# Patient Record
Sex: FEMALE | Race: BLACK OR AFRICAN AMERICAN | NOT HISPANIC OR LATINO | Employment: UNEMPLOYED | URBAN - METROPOLITAN AREA
[De-identification: names, ages, dates, MRNs, and addresses within clinical notes are randomized per-mention and may not be internally consistent; named-entity substitution may affect disease eponyms.]

---

## 2022-10-18 PROBLEM — F32.9 MAJOR DEPRESSIVE DISORDER, SINGLE EPISODE, UNSPECIFIED: Status: ACTIVE | Noted: 2019-01-24

## 2022-10-19 ENCOUNTER — OFFICE VISIT (OUTPATIENT)
Dept: INTERNAL MEDICINE | Facility: CLINIC | Age: 22
End: 2022-10-19
Payer: COMMERCIAL

## 2022-10-19 VITALS
OXYGEN SATURATION: 98 % | WEIGHT: 99.6 LBS | SYSTOLIC BLOOD PRESSURE: 110 MMHG | DIASTOLIC BLOOD PRESSURE: 75 MMHG | TEMPERATURE: 97.7 F | HEART RATE: 82 BPM

## 2022-10-19 DIAGNOSIS — N92.0 MENORRHAGIA WITH REGULAR CYCLE: ICD-10-CM

## 2022-10-19 DIAGNOSIS — Z91.010 ALLERGY TO PEANUTS: Primary | ICD-10-CM

## 2022-10-19 DIAGNOSIS — J31.0 CHRONIC RHINITIS: ICD-10-CM

## 2022-10-19 LAB
BASOPHILS # BLD AUTO: 0.1 10E3/UL (ref 0–0.2)
BASOPHILS NFR BLD AUTO: 2 %
EOSINOPHIL # BLD AUTO: 0.5 10E3/UL (ref 0–0.7)
EOSINOPHIL NFR BLD AUTO: 13 %
ERYTHROCYTE [DISTWIDTH] IN BLOOD BY AUTOMATED COUNT: 15.3 % (ref 10–15)
FERRITIN SERPL-MCNC: 9 NG/ML (ref 12–150)
HCT VFR BLD AUTO: 37.6 % (ref 35–47)
HGB BLD-MCNC: 11.8 G/DL (ref 11.7–15.7)
IRON SATN MFR SERPL: 18 % (ref 15–46)
IRON SERPL-MCNC: 57 UG/DL (ref 35–180)
LYMPHOCYTES # BLD AUTO: 1.7 10E3/UL (ref 0.8–5.3)
LYMPHOCYTES NFR BLD AUTO: 43 %
MCH RBC QN AUTO: 28 PG (ref 26.5–33)
MCHC RBC AUTO-ENTMCNC: 31.4 G/DL (ref 31.5–36.5)
MCV RBC AUTO: 89 FL (ref 78–100)
MONOCYTES # BLD AUTO: 0.6 10E3/UL (ref 0–1.3)
MONOCYTES NFR BLD AUTO: 14 %
NEUTROPHILS # BLD AUTO: 1.1 10E3/UL (ref 1.6–8.3)
NEUTROPHILS NFR BLD AUTO: 28 %
PLATELET # BLD AUTO: 342 10E3/UL (ref 150–450)
RBC # BLD AUTO: 4.21 10E6/UL (ref 3.8–5.2)
TIBC SERPL-MCNC: 312 UG/DL (ref 240–430)
WBC # BLD AUTO: 4 10E3/UL (ref 4–11)

## 2022-10-19 PROCEDURE — 82728 ASSAY OF FERRITIN: CPT | Performed by: INTERNAL MEDICINE

## 2022-10-19 PROCEDURE — 85025 COMPLETE CBC W/AUTO DIFF WBC: CPT | Performed by: INTERNAL MEDICINE

## 2022-10-19 PROCEDURE — 99204 OFFICE O/P NEW MOD 45 MIN: CPT | Performed by: INTERNAL MEDICINE

## 2022-10-19 PROCEDURE — 83540 ASSAY OF IRON: CPT | Performed by: INTERNAL MEDICINE

## 2022-10-19 PROCEDURE — 83550 IRON BINDING TEST: CPT | Performed by: INTERNAL MEDICINE

## 2022-10-19 PROCEDURE — 36415 COLL VENOUS BLD VENIPUNCTURE: CPT | Performed by: INTERNAL MEDICINE

## 2022-10-19 RX ORDER — EPINEPHRINE 0.3 MG/.3ML
0.3 INJECTION SUBCUTANEOUS PRN
Qty: 2 EACH | Refills: 3 | Status: SHIPPED | OUTPATIENT
Start: 2022-10-19 | End: 2024-08-23

## 2022-10-19 RX ORDER — EPINEPHRINE 0.3 MG/.3ML
0.3 INJECTION SUBCUTANEOUS PRN
COMMUNITY
End: 2022-10-19

## 2022-10-19 RX ORDER — LORATADINE 10 MG/1
10 TABLET ORAL DAILY
COMMUNITY
End: 2022-10-19

## 2022-10-19 RX ORDER — LORATADINE 10 MG/1
10 TABLET ORAL DAILY
Qty: 90 TABLET | Refills: 4 | Status: SHIPPED | OUTPATIENT
Start: 2022-10-19 | End: 2023-08-03

## 2022-10-19 RX ORDER — FLUTICASONE PROPIONATE 50 MCG
1 SPRAY, SUSPENSION (ML) NASAL DAILY
Qty: 18.2 ML | Refills: 3 | Status: SHIPPED | OUTPATIENT
Start: 2022-10-19 | End: 2023-08-03

## 2022-10-19 RX ORDER — FLUTICASONE PROPIONATE 50 MCG
1 SPRAY, SUSPENSION (ML) NASAL DAILY
COMMUNITY
End: 2022-10-19

## 2022-10-19 NOTE — LETTER
[facility]  [address] [address]  Phone: [phone]   10/20/2022      Greglillian Nunes  [address] [facility] [address]        Dear MsGaby Jack DAVILA Des:    I am writing to inform you of the results of the laboratory tests you had done recently. Your blood counts are normal and you are not anemic. However, your iron is mildly low. An iron supplement is recommended in your care. There are lots of over-the-counter iron supplements. The most commonly used preparations are called Ferrous Gluconate and Ferrous Sulfate. Some important things to remember when it comes to iron supplementation:    -Iron is best absorbed when taken on an empty stomach, with water or fruit juice (adults: full glass or 8 ounces; children:   glass or 4 ounces), about 1 hour before or 2 hours after meals. However, to lessen the possibility of stomach upset, iron may be taken with food or immediately after meals. If you take antacids (ex: omeprazole, TUMS, ranitidine), your iron tablets should be taken two hours before or four hours after the antacids.    -Constipation and diarrhea are very common side effects. If constipation becomes a problem, I recommend starting a laxative such as MiraLax or sennosides. Please let me know if these side effects are so severe that you stop taking the iron supplement.    -Iron tablets may cause other drugs you are taking to not work as well. Some of these include tetracycline, penicillin, and ciprofloxacin and drugs used for hypothyroidism, Parkinson disease, and seizures.    -Black stools are normal when taking iron tablets. Tell me right away though if the stools are tarry-looking as well as black, if they have red streaks, or if you develop ramps, sharp pains, or soreness in the stomach.    -Taking a Vitamin C supplement at the same time as your iron supplement will help more iron get absorbed.    -Recently published studies found the  paradoxical result that taking your iron supplement every OTHER day (so Monday, then Wednesday, then Friday, etc) causes more iron absorption then taking the iron supplement every day. This every other day regimen may also cut down on side effects of the iron supplement. However, some patients struggle to remember taking a pill every other day and prefer to still take the iron daily. That's okay! Either regimen will work.    If you have any further questions or problems, please contact our nurse line at [phone]. An even easier way to get ahold of our team is through [employer], which you can sign up for at [email or web address]. [employer] is not only a great way to communicate with us, but also allows you to see your full results.        Sincerely,        Layton Cohen MD, MPH  Department of Internal Medicine  [facility]

## 2022-10-19 NOTE — PATIENT INSTRUCTIONS
- Our team will contact you via [employer] (if you sign up for it), telephone call (if results are urgent), or otherwise via letter in the mail with the results of today's lab tests once I have a chance to review them

## 2022-10-19 NOTE — PROGRESS NOTES
Assessment & Plan   Allergy to peanuts  Refilled chronic script per request.  - EPINEPHrine (ANY BX GENERIC EQUIV) 0.3 MG/0.3ML injection 2-pack; Inject 0.3 mLs (0.3 mg) into the muscle as needed for anaphylaxis May repeat one time in 5-15 minutes if response to initial dose is inadequate.    Chronic rhinitis  Finds benefit from these meds. Refilled.  - fluticasone (FLONASE) 50 MCG/ACT nasal spray; Spray 1 spray into both nostrils daily  - loratadine (CLARITIN) 10 MG tablet; Take 1 tablet (10 mg) by mouth daily    Menorrhagia with regular cycle  I'm unclear what the condition her sister may have been talking about was, but asked her to talk with her family and send me a [employer] message when she has more info on it to see if she ought to be screened/tested for the condition. Will screen for JOSY as below.  - CBC with Platelets & Differential; Future  - Ferritin; Future  - Iron & Iron Binding Capacity; Future    Return in about 6 months (around 4/19/2023) for Physical Exam.    Layton Cohen MD  [facility]    Rodney Santiago is a 22 year old who presents for med check. This is the first time I have met Jack. Reports her allergies have been bad since she ran out of loratadine and Flonase. She'd like to be screened for iron deficiency and anemia. She said her sister told her to get tested for something that's like anemia but isn't anemia. She thinks it starts with a F. She's not sure what it's called and is unable to ask her sister during the visit today. She endorses heavy periods.    Review of Systems   Constitutional, HEENT, gyn systems are negative, except as otherwise noted.      Objective    /75   Pulse 82   Temp 97.7  F (36.5  C) (Temporal)   Wt 45.2 kg (99 lb 9.6 oz)   LMP  (LMP Unknown)   SpO2 98%   There is no height or weight on file to calculate BMI.     Physical Exam   GENERAL: alert and in no distress.  EYES: conjunctivae/corneas clear. EOMs grossly  intact  HENT: Facies symmetric.  RESP: No iWOB.  MSK: Moves all four extremities freely  SKIN: No significant ulcers, lesions, or rashes on the visualized portions of the skin  NEURO: CN II-XII grossly intact.

## 2022-11-18 ENCOUNTER — OFFICE VISIT (OUTPATIENT)
Dept: FAMILY MEDICINE | Facility: CLINIC | Age: 22
End: 2022-11-18
Payer: COMMERCIAL

## 2022-11-18 VITALS
DIASTOLIC BLOOD PRESSURE: 52 MMHG | HEART RATE: 82 BPM | SYSTOLIC BLOOD PRESSURE: 94 MMHG | OXYGEN SATURATION: 100 % | TEMPERATURE: 97 F

## 2022-11-18 DIAGNOSIS — L30.9 ECZEMA, UNSPECIFIED TYPE: Primary | ICD-10-CM

## 2022-11-18 PROCEDURE — 99213 OFFICE O/P EST LOW 20 MIN: CPT

## 2022-11-18 RX ORDER — TRIAMCINOLONE ACETONIDE 1 MG/G
CREAM TOPICAL 2 TIMES DAILY
Qty: 80 G | Refills: 0 | Status: SHIPPED | OUTPATIENT
Start: 2022-11-18 | End: 2022-11-25

## 2022-11-18 NOTE — PROGRESS NOTES
Assessment & Plan     Eczema, unspecified type    - triamcinolone (KENALOG) 0.1 % external cream; Apply topically 2 times daily for 7 days      12 minutes spent on the date of the encounter doing chart review, patient visit and documentation        See Patient Instructions    Return in about 2 weeks (around 12/2/2022), or if symptoms worsen or fail to improve.    [facility]    Rodney Santiago is a 22 year old presenting for the following health issues:  Urgent Care and Derm Problem (Hx of eczema and has used otc cream. NO improvement. Really itchy and open)      HPI     Presents with possible eczema to the tops of her hands.  States she gets this seasonally and has tried many over-the-counter products which do not seem to be helping much.  We will get patches of erythema and flakiness to the tops of her hands but not the palms of her hands.  No scales    Review of Systems   Constitutional, HEENT, cardiovascular, pulmonary, gi and gu systems are negative, except as otherwise noted.      Objective    BP 94/52   Pulse 82   Temp 97  F (36.1  C) (Tympanic)   SpO2 100%   There is no height or weight on file to calculate BMI.  Physical Exam   GENERAL: healthy, alert and no distress  SKIN: Patches of erythema to the dorsums of both hands.

## 2023-02-12 ENCOUNTER — HEALTH MAINTENANCE LETTER (OUTPATIENT)
Age: 23
End: 2023-02-12

## 2023-07-17 ENCOUNTER — OFFICE VISIT (OUTPATIENT)
Dept: FAMILY MEDICINE | Facility: CLINIC | Age: 23
End: 2023-07-17
Payer: COMMERCIAL

## 2023-07-17 VITALS
HEART RATE: 100 BPM | DIASTOLIC BLOOD PRESSURE: 70 MMHG | SYSTOLIC BLOOD PRESSURE: 110 MMHG | TEMPERATURE: 98.9 F | OXYGEN SATURATION: 98 %

## 2023-07-17 DIAGNOSIS — R10.9 ABDOMINAL DISCOMFORT: ICD-10-CM

## 2023-07-17 DIAGNOSIS — R30.0 DYSURIA: Primary | ICD-10-CM

## 2023-07-17 LAB
ALBUMIN UR-MCNC: NEGATIVE MG/DL
APPEARANCE UR: CLEAR
BILIRUB UR QL STRIP: NEGATIVE
COLOR UR AUTO: YELLOW
GLUCOSE UR STRIP-MCNC: NEGATIVE MG/DL
HGB UR QL STRIP: NEGATIVE
KETONES UR STRIP-MCNC: NEGATIVE MG/DL
LEUKOCYTE ESTERASE UR QL STRIP: NEGATIVE
NITRATE UR QL: NEGATIVE
PH UR STRIP: 6.5 [PH] (ref 5–7)
SP GR UR STRIP: 1.01 (ref 1–1.03)
UROBILINOGEN UR STRIP-ACNC: 0.2 E.U./DL

## 2023-07-17 PROCEDURE — 99203 OFFICE O/P NEW LOW 30 MIN: CPT

## 2023-07-17 PROCEDURE — 81003 URINALYSIS AUTO W/O SCOPE: CPT

## 2023-07-17 ASSESSMENT — ENCOUNTER SYMPTOMS
HEMATURIA: 0
FEVER: 0
ABDOMINAL PAIN: 1
DIFFICULTY URINATING: 1
FLANK PAIN: 0

## 2023-07-17 NOTE — PATIENT INSTRUCTIONS
[facility]  Located in: [facility]  Address: [address] , [place] [address]  Open ? Closes 8?PM  Phone: [phone]    Please go to the above clinic, check in at the Urgent Care desk for a LAB ONLY visit.

## 2023-07-17 NOTE — PROGRESS NOTES
Assessment & Plan       ICD-10-CM    1. Dysuria  R30.0 UA Macroscopic with reflex to Microscopic and Culture      2. Abdominal discomfort  R10.9 Wet preparation - Clinic Collect           Patient instructions:  To get lab, at this time increase fluids, monitor for new or worsening symptoms and schedule follow up    Medical decision making:  UA negative, Wet prep pending. Discussed scheduling follow up with PCP for well woman and review of symptoms if everything negative, this could be underlying endometriosis, BV, yeast infection, cysts.     Return if symptoms worsen or fail to improve, for Follow up.    At the end of the encounter, I discussed results, diagnosis, medications. Discussed red flags for immediate return to clinic/ER, as well as indications for follow up if no improvement. Patient understood and agreed to plan. Patient was stable for discharge.    Rodney Santiago is a 23 year old female who presents to clinic today for the following health issues:  Chief Complaint   Patient presents with     Urgent Care     UTI     Sx started last night with pain when urinating, bloating sensation and cloudy urine     LMP: beginning of this month, not sexually active    UTI  This is a new problem. The current episode started yesterday. The problem has been unchanged. Associated symptoms include abdominal pain. Pertinent negatives include no fever.           Review of Systems   Constitutional: Negative for fever.   Gastrointestinal: Positive for abdominal pain.   Genitourinary: Positive for difficulty urinating and urgency. Negative for flank pain, hematuria and vaginal discharge.       Problem List:  There are no relevant problems documented for this patient.      History reviewed. No pertinent past medical history.    Social History     Tobacco Use     Smoking status: Never     Passive exposure: Never     Smokeless tobacco: Never   Substance Use Topics     Alcohol use: Not on file           Objective    /70    Pulse 100   Temp 98.9  F (37.2  C) (Tympanic)   LMP 07/03/2023   SpO2 98%   Physical Exam  Constitutional:       Appearance: Normal appearance.   HENT:      Head: Normocephalic and atraumatic.   Abdominal:      General: Abdomen is flat. Bowel sounds are normal.      Tenderness: There is no abdominal tenderness. Negative signs include Griffin's sign and McBurney's sign.   Musculoskeletal:      Cervical back: Normal range of motion and neck supple.   Skin:     General: Skin is warm and dry.   Neurological:      General: No focal deficit present.      Mental Status: She is alert and oriented to person, place, and time.   Psychiatric:         Mood and Affect: Mood normal.         Behavior: Behavior normal.         Thought Content: Thought content normal.         Judgment: Judgment normal.              Oj Gonzalez PA-C

## 2023-07-18 ENCOUNTER — LAB (OUTPATIENT)
Dept: LAB | Facility: CLINIC | Age: 23
End: 2023-07-18
Payer: COMMERCIAL

## 2023-07-18 DIAGNOSIS — R10.9 ABDOMINAL DISCOMFORT: ICD-10-CM

## 2023-07-18 LAB
CLUE CELLS: ABNORMAL
TRICHOMONAS, WET PREP: ABNORMAL
WBC'S/HIGH POWER FIELD, WET PREP: ABNORMAL
YEAST, WET PREP: ABNORMAL

## 2023-07-18 PROCEDURE — 87210 SMEAR WET MOUNT SALINE/INK: CPT

## 2023-08-03 ENCOUNTER — OFFICE VISIT (OUTPATIENT)
Dept: INTERNAL MEDICINE | Facility: CLINIC | Age: 23
End: 2023-08-03
Payer: COMMERCIAL

## 2023-08-03 VITALS
TEMPERATURE: 98.1 F | WEIGHT: 102.6 LBS | HEIGHT: 66 IN | HEART RATE: 57 BPM | BODY MASS INDEX: 16.49 KG/M2 | OXYGEN SATURATION: 97 % | SYSTOLIC BLOOD PRESSURE: 98 MMHG | DIASTOLIC BLOOD PRESSURE: 64 MMHG

## 2023-08-03 DIAGNOSIS — E53.8 VITAMIN B12 DEFICIENCY (NON ANEMIC): ICD-10-CM

## 2023-08-03 DIAGNOSIS — R30.0 DYSURIA: Primary | ICD-10-CM

## 2023-08-03 DIAGNOSIS — Z86.2 HISTORY OF IRON DEFICIENCY ANEMIA: ICD-10-CM

## 2023-08-03 DIAGNOSIS — R53.83 OTHER FATIGUE: ICD-10-CM

## 2023-08-03 DIAGNOSIS — E55.9 VITAMIN D DEFICIENCY: ICD-10-CM

## 2023-08-03 DIAGNOSIS — R39.15 URINARY URGENCY: ICD-10-CM

## 2023-08-03 DIAGNOSIS — J31.0 CHRONIC RHINITIS: ICD-10-CM

## 2023-08-03 LAB
ALBUMIN UR-MCNC: NEGATIVE MG/DL
ANION GAP SERPL CALCULATED.3IONS-SCNC: 10 MMOL/L (ref 7–15)
APPEARANCE UR: CLEAR
BACTERIA #/AREA URNS HPF: ABNORMAL /HPF
BILIRUB UR QL STRIP: NEGATIVE
BUN SERPL-MCNC: 4.7 MG/DL (ref 6–20)
CALCIUM SERPL-MCNC: 9.5 MG/DL (ref 8.6–10)
CHLORIDE SERPL-SCNC: 103 MMOL/L (ref 98–107)
COLOR UR AUTO: YELLOW
CREAT SERPL-MCNC: 0.63 MG/DL (ref 0.51–0.95)
DEPRECATED CALCIDIOL+CALCIFEROL SERPL-MC: 21 UG/L (ref 20–75)
DEPRECATED HCO3 PLAS-SCNC: 25 MMOL/L (ref 22–29)
ERYTHROCYTE [DISTWIDTH] IN BLOOD BY AUTOMATED COUNT: 13 % (ref 10–15)
FERRITIN SERPL-MCNC: 20 NG/ML (ref 6–175)
GFR SERPL CREATININE-BSD FRML MDRD: >90 ML/MIN/1.73M2
GLUCOSE SERPL-MCNC: 89 MG/DL (ref 70–99)
GLUCOSE UR STRIP-MCNC: NEGATIVE MG/DL
HCT VFR BLD AUTO: 38.4 % (ref 35–47)
HGB BLD-MCNC: 12.6 G/DL (ref 11.7–15.7)
HGB UR QL STRIP: ABNORMAL
IRON BINDING CAPACITY (ROCHE): 308 UG/DL (ref 240–430)
IRON SATN MFR SERPL: 25 % (ref 15–46)
IRON SERPL-MCNC: 76 UG/DL (ref 37–145)
KETONES UR STRIP-MCNC: NEGATIVE MG/DL
LEUKOCYTE ESTERASE UR QL STRIP: NEGATIVE
MCH RBC QN AUTO: 30.1 PG (ref 26.5–33)
MCHC RBC AUTO-ENTMCNC: 32.8 G/DL (ref 31.5–36.5)
MCV RBC AUTO: 92 FL (ref 78–100)
MUCOUS THREADS #/AREA URNS LPF: PRESENT /LPF
NITRATE UR QL: NEGATIVE
PH UR STRIP: 6 [PH] (ref 5–7)
PLATELET # BLD AUTO: 265 10E3/UL (ref 150–450)
POTASSIUM SERPL-SCNC: 4.6 MMOL/L (ref 3.4–5.3)
RBC # BLD AUTO: 4.18 10E6/UL (ref 3.8–5.2)
RBC #/AREA URNS AUTO: ABNORMAL /HPF
SODIUM SERPL-SCNC: 138 MMOL/L (ref 136–145)
SP GR UR STRIP: 1.02 (ref 1–1.03)
SQUAMOUS #/AREA URNS AUTO: ABNORMAL /LPF
TSH SERPL DL<=0.005 MIU/L-ACNC: 1.07 UIU/ML (ref 0.3–4.2)
UROBILINOGEN UR STRIP-ACNC: 0.2 E.U./DL
VIT B12 SERPL-MCNC: 767 PG/ML (ref 232–1245)
WBC # BLD AUTO: 4.4 10E3/UL (ref 4–11)
WBC #/AREA URNS AUTO: ABNORMAL /HPF

## 2023-08-03 PROCEDURE — 83550 IRON BINDING TEST: CPT | Performed by: INTERNAL MEDICINE

## 2023-08-03 PROCEDURE — 82306 VITAMIN D 25 HYDROXY: CPT | Performed by: INTERNAL MEDICINE

## 2023-08-03 PROCEDURE — 84443 ASSAY THYROID STIM HORMONE: CPT | Performed by: INTERNAL MEDICINE

## 2023-08-03 PROCEDURE — 82607 VITAMIN B-12: CPT | Performed by: INTERNAL MEDICINE

## 2023-08-03 PROCEDURE — 36415 COLL VENOUS BLD VENIPUNCTURE: CPT | Performed by: INTERNAL MEDICINE

## 2023-08-03 PROCEDURE — 80048 BASIC METABOLIC PNL TOTAL CA: CPT | Performed by: INTERNAL MEDICINE

## 2023-08-03 PROCEDURE — 99214 OFFICE O/P EST MOD 30 MIN: CPT | Performed by: INTERNAL MEDICINE

## 2023-08-03 PROCEDURE — 83540 ASSAY OF IRON: CPT | Performed by: INTERNAL MEDICINE

## 2023-08-03 PROCEDURE — 82728 ASSAY OF FERRITIN: CPT | Performed by: INTERNAL MEDICINE

## 2023-08-03 PROCEDURE — 85027 COMPLETE CBC AUTOMATED: CPT | Performed by: INTERNAL MEDICINE

## 2023-08-03 PROCEDURE — 81001 URINALYSIS AUTO W/SCOPE: CPT | Performed by: INTERNAL MEDICINE

## 2023-08-03 RX ORDER — FLUTICASONE PROPIONATE 50 MCG
1 SPRAY, SUSPENSION (ML) NASAL DAILY
Qty: 48 ML | Refills: 3 | Status: SHIPPED | OUTPATIENT
Start: 2023-08-03

## 2023-08-03 RX ORDER — LORATADINE 10 MG/1
10 TABLET ORAL DAILY
Qty: 90 TABLET | Refills: 4 | Status: SHIPPED | OUTPATIENT
Start: 2023-08-03

## 2023-08-03 RX ORDER — SULFAMETHOXAZOLE/TRIMETHOPRIM 800-160 MG
1 TABLET ORAL 2 TIMES DAILY
Qty: 10 TABLET | Refills: 0 | Status: SHIPPED | OUTPATIENT
Start: 2023-08-03 | End: 2023-08-08

## 2023-08-03 ASSESSMENT — PATIENT HEALTH QUESTIONNAIRE - PHQ9
SUM OF ALL RESPONSES TO PHQ QUESTIONS 1-9: 4
SUM OF ALL RESPONSES TO PHQ QUESTIONS 1-9: 4
10. IF YOU CHECKED OFF ANY PROBLEMS, HOW DIFFICULT HAVE THESE PROBLEMS MADE IT FOR YOU TO DO YOUR WORK, TAKE CARE OF THINGS AT HOME, OR GET ALONG WITH OTHER PEOPLE: NOT DIFFICULT AT ALL

## 2023-08-03 ASSESSMENT — ASTHMA QUESTIONNAIRES: ACT_TOTALSCORE: 24

## 2023-08-03 ASSESSMENT — PAIN SCALES - GENERAL: PAINLEVEL: NO PAIN (0)

## 2023-08-03 NOTE — PROGRESS NOTES
Assessment & Plan     (R30.0) Dysuria  (primary encounter diagnosis)  Comment:    Possible urinary tract infection as the cause of the sudden urinary urgency.      Short course of antibiotics.      If the symptoms completely resolve with this treatment, then no further workup required.      If the symptosm continue, then referral to uro Gynecology to investigate  further causes for the mild hematuria and urinary irgency.  (Small amount of blood in the urine)     Rechecking blood tests and I will let you know if anything is abnormal    Plan: UA Macroscopic with reflex to Microscopic and         Culture - Clinic Collect, UA Microscopic with         Reflex to Culture,         sulfamethoxazole-trimethoprim (BACTRIM DS)         800-160 MG tablet            (R39.15) Urinary urgency  Comment:   Plan: Adult Uro/Gyn  Referral,         sulfamethoxazole-trimethoprim (BACTRIM DS)         800-160 MG tablet            (R53.83) Other fatigue  Comment: No obvious medical cause for the fatigue.    Will check for routine medical cause with the labs as ordered, suspect they will return abnormal.  Will follow up any abnormal labs as indicated.  D  Nonspecific fatigue may have other causes including stress and problems at home or work, or may be associated with underlying mood disorders in general.    Other causes may include sleep apnea, chronic fatigue syndrome, post viral syndromes, fibromyalgia, connective tissue disease etc.   Plan: CBC with platelets, Vitamin B12, Iron & Iron         Binding Capacity, Ferritin, TSH with free T4         reflex, Basic metabolic panel, Vitamin D         Deficiency            (E55.9) Vitamin D deficiency  Comment:   Plan: Vitamin D Deficiency            (Z86.2) History of iron deficiency anemia  Comment:   Plan: CBC with platelets, Vitamin B12, Iron & Iron         Binding Capacity, Ferritin            (E53.8) Vitamin B12 deficiency (non anemic)  Comment:   Plan: Vitamin B12         "    (J31.0) Chronic rhinitis  Comment: trial regular steroid nasal spray and daily claritin.   Plan: fluticasone (FLONASE) 50 MCG/ACT nasal spray,         loratadine (CLARITIN) 10 MG tablet                      MED REC REQUIRED  Post Medication Reconciliation Status: discharge medications reconciled, continue medications without change      Irineo Fishman MD  [facility] is a 23 year old, presenting for the following health issues:  RECHECK (Follow up U/C-7/17/23)        8/3/2023     8:19 AM   Additional Questions   Roomed by Opal PEREZ       [facility]     ED/UC Followup:    Facility:  [facility] U/C  Date of visit: 7/17/23  Reason for visit: abdominal discomfort and urinary problem  Current Status: urinary symptoms continuing- abdominal pain resolved    Urinary urgency starting 3 weeks ago  No flank pain  No fevers, no vaginal discharge  Normal menses with \"normal\" flow.  Has prior history of hematuria off and on for last 10+ years in the past, extensive workup shows no causes, it just resolved without treatment or diagnosis.   Some results available in EPIC.       history kvng on def anemia, wants to know her iron levels.   Labs all looked good in Oct 2022 with normal CBC and iron levels, ferritin low.     Reports history of vitamin B12 and D deficiency in the past, wants these checked.       Reports nonspecific fatigue of a generalized nature for the last few months.  Denies intestinal symptoms (i.e. No diarrhea, no constipation, no irregular BMs), Denies chest pain, shortness of breath, or dyspnea on exertion.  Denies fevers, unintended weight loss, unexplained abdominal pain, unexplained joint or muscle pain,  recent travels, or  history of autoimmune disease.   The patient does not feel that they are aware of any specific cause for this fatigue.      **I reviewed the information recorded in the patient's [facility] chart (including but not limited to medical " "history, surgical history, family history, problem list, medication list, and allergy list) and updated the information as indicated based on the patients reported information.        Review of Systems   Constitutional, HEENT, cardiovascular, pulmonary, gi and gu systems are negative, except as otherwise noted.      Objective    BP 98/64   Pulse 57   Temp 98.1  F (36.7  C) (Oral)   Ht 1.676 m (5' 6\")   Wt 46.5 kg (102 lb 9.6 oz)   LMP 07/28/2023   SpO2 97%   BMI 16.56 kg/m    Body mass index is 16.56 kg/m .  Physical Exam   GENERAL alert and no distress  EYES:  Normal sclera,conjunctiva, EOMI  HENT: oral and posterior pharynx without lesions or erythema, facies symmetric  NECK: Neck supple. No LAD, without thyroidmegaly.  RESP: Clear to ausculation bilaterally without wheezes or crackles. Normal BS in all fields.  CV: RRR normal S1S2 without murmurs, rubs or gallops.  LYMPH: no cervical lymph adenopathy appreciated  MS: extremities- no gross deformities of the visible extremities noted,   EXT:  no lower extremity edema  PSYCH: Alert and oriented times 3; speech- coherent  SKIN:  No obvious significant skin lesions on visible portions of face   ABD:  soft, nondistended, no obviosu tenderess, minimal if any discomfort with palaptio of lower abdomen.     Results for orders placed or performed in visit on 08/03/23   UA Macroscopic with reflex to Microscopic and Culture - Clinic Collect     Status: Abnormal    Specimen: Urine, Clean Catch   Result Value Ref Range    Color Urine Yellow Colorless, Straw, Light Yellow, Yellow    Appearance Urine Clear Clear    Glucose Urine Negative Negative mg/dL    Bilirubin Urine Negative Negative    Ketones Urine Negative Negative mg/dL    Specific Gravity Urine 1.025 1.003 - 1.035    Blood Urine Moderate (A) Negative    pH Urine 6.0 5.0 - 7.0    Protein Albumin Urine Negative Negative mg/dL    Urobilinogen Urine 0.2 0.2, 1.0 E.U./dL    Nitrite Urine Negative Negative    " Leukocyte Esterase Urine Negative Negative   CBC with platelets     Status: Normal   Result Value Ref Range    WBC Count 4.4 4.0 - 11.0 10e3/uL    RBC Count 4.18 3.80 - 5.20 10e6/uL    Hemoglobin 12.6 11.7 - 15.7 g/dL    Hematocrit 38.4 35.0 - 47.0 %    MCV 92 78 - 100 fL    MCH 30.1 26.5 - 33.0 pg    MCHC 32.8 31.5 - 36.5 g/dL    RDW 13.0 10.0 - 15.0 %    Platelet Count 265 150 - 450 10e3/uL   Vitamin B12     Status: Normal   Result Value Ref Range    Vitamin B12 767 232 - 1,245 pg/mL   Iron & Iron Binding Capacity     Status: Normal   Result Value Ref Range    Iron 76 37 - 145 ug/dL    Iron Binding Capacity 308 240 - 430 ug/dL    Iron Sat Index 25 15 - 46 %   Ferritin     Status: Normal   Result Value Ref Range    Ferritin 20 6 - 175 ng/mL   TSH with free T4 reflex     Status: Normal   Result Value Ref Range    TSH 1.07 0.30 - 4.20 uIU/mL   Basic metabolic panel     Status: Abnormal   Result Value Ref Range    Sodium 138 136 - 145 mmol/L    Potassium 4.6 3.4 - 5.3 mmol/L    Chloride 103 98 - 107 mmol/L    Carbon Dioxide (CO2) 25 22 - 29 mmol/L    Anion Gap 10 7 - 15 mmol/L    Urea Nitrogen 4.7 (L) 6.0 - 20.0 mg/dL    Creatinine 0.63 0.51 - 0.95 mg/dL    Calcium 9.5 8.6 - 10.0 mg/dL    Glucose 89 70 - 99 mg/dL    GFR Estimate >90 >60 mL/min/1.73m2   Vitamin D Deficiency     Status: Normal   Result Value Ref Range    Vitamin D, Total (25-Hydroxy) 21 20 - 75 ug/L    Narrative    Season, race, dietary intake, and treatment affect the concentration of 25-hydroxy-Vitamin D. Values may decrease during winter months and increase during summer months. Values 20-29 ug/L may indicate Vitamin D insufficiency and values <20 ug/L may indicate Vitamin D deficiency.    Vitamin D determination is routinely performed by an immunoassay specific for 25 hydroxyvitamin D3.  If an individual is on vitamin D2(ergocalciferol) supplementation, please specify 25 OH vitamin D2 and D3 level determination by LCMSMS test VITD23.     UA  Microscopic with Reflex to Culture     Status: Abnormal   Result Value Ref Range    Bacteria Urine Moderate (A) None Seen /HPF    RBC Urine 5-10 (A) 0-2 /HPF /HPF    WBC Urine 0-5 0-5 /HPF /HPF    Squamous Epithelials Urine Few (A) None Seen /LPF    Mucus Urine Present (A) None Seen /LPF    Narrative    Urine Culture not indicated

## 2023-08-03 NOTE — PATIENT INSTRUCTIONS
Possible urinary tract infection as the cause of the sudden urinary urgency.      Short course of antibiotics.      If the symptoms completely resolve with this treatment, then no further workup required.      If the symptosm continue, then referral to uro Gynecology to investigate  further causes for the mild hematuria and urinary irgency.  (Small amount of blood in the urine)     Rechecking blood tests and I will let you know if anything is abnormal

## 2023-08-18 ENCOUNTER — OFFICE VISIT (OUTPATIENT)
Dept: INTERNAL MEDICINE | Facility: CLINIC | Age: 23
End: 2023-08-18
Payer: COMMERCIAL

## 2023-08-18 VITALS
BODY MASS INDEX: 16.55 KG/M2 | OXYGEN SATURATION: 100 % | SYSTOLIC BLOOD PRESSURE: 100 MMHG | HEART RATE: 78 BPM | HEIGHT: 66 IN | DIASTOLIC BLOOD PRESSURE: 60 MMHG | TEMPERATURE: 98 F | WEIGHT: 103 LBS

## 2023-08-18 DIAGNOSIS — R53.83 FATIGUE, UNSPECIFIED TYPE: Primary | ICD-10-CM

## 2023-08-18 DIAGNOSIS — Z23 NEED FOR TDAP VACCINATION: ICD-10-CM

## 2023-08-18 DIAGNOSIS — N31.9 BLADDER DYSFUNCTION: ICD-10-CM

## 2023-08-18 DIAGNOSIS — Z86.2 HISTORY OF IRON DEFICIENCY ANEMIA: ICD-10-CM

## 2023-08-18 LAB — HGB BLD-MCNC: 12.5 G/DL (ref 11.7–15.7)

## 2023-08-18 PROCEDURE — 99213 OFFICE O/P EST LOW 20 MIN: CPT | Mod: 25 | Performed by: INTERNAL MEDICINE

## 2023-08-18 PROCEDURE — 36415 COLL VENOUS BLD VENIPUNCTURE: CPT | Performed by: INTERNAL MEDICINE

## 2023-08-18 PROCEDURE — 90715 TDAP VACCINE 7 YRS/> IM: CPT | Performed by: INTERNAL MEDICINE

## 2023-08-18 PROCEDURE — 90471 IMMUNIZATION ADMIN: CPT | Performed by: INTERNAL MEDICINE

## 2023-08-18 PROCEDURE — 85018 HEMOGLOBIN: CPT | Performed by: INTERNAL MEDICINE

## 2023-08-18 ASSESSMENT — PATIENT HEALTH QUESTIONNAIRE - PHQ9
SUM OF ALL RESPONSES TO PHQ QUESTIONS 1-9: 0
10. IF YOU CHECKED OFF ANY PROBLEMS, HOW DIFFICULT HAVE THESE PROBLEMS MADE IT FOR YOU TO DO YOUR WORK, TAKE CARE OF THINGS AT HOME, OR GET ALONG WITH OTHER PEOPLE: NOT DIFFICULT AT ALL
SUM OF ALL RESPONSES TO PHQ QUESTIONS 1-9: 0

## 2023-08-18 ASSESSMENT — PAIN SCALES - GENERAL: PAINLEVEL: NO PAIN (0)

## 2023-08-18 ASSESSMENT — ASTHMA QUESTIONNAIRES: ACT_TOTALSCORE: 24

## 2023-08-18 NOTE — PROGRESS NOTES
"  Assessment & Plan     (R53.83) Fatigue, unspecified type  (primary encounter diagnosis)  Comment: No obvious cause for the fatigue, labs all normal including hemoglobin and iron.  No obvious medical cause for the fatigue.    Nonspecific fatigue may have other causes including stress and problems at home or work, or may be associated with underlying mood disorders in general.    Other causes may include sleep apnea, chronic fatigue syndrome, post viral syndromes, fibromyalgia, connective tissue disease etc.   Plan: Hemoglobin            (Z86.2) History of iron deficiency anemia  Comment: Labs all normal including hemoglobin and iron and ferritin.  Continue what ever measures she has been taking to increase her iron intake.  Plan:     (N31.9) Bladder dysfunction  Comment: Suspect largely a functional matter with her \"nervous bladder\".  Referral to urogynecology was ordered, she was contacted to make an appointment but has not made the return call yet.  Plan:     (Z23) Need for Tdap vaccination  Comment: Reviewed vaccine recommendations.  Recommended a update of the tetanus shot every 10 years, this was given today.  Strongly recommend she get the annual influenza vaccine every fall, strongly recommend she proceed with the COVID vaccines as well.  Plan: TDAP 10-64Y (ADACEL,BOOSTRIX)                            Irineo Fishman MD  [facility] SILVINO Santiago is a 23 year old, presenting for the following health issues:  Fatigue (Would like iron levels checked)    Here today with her mother to review recent labs and recent symptoms.        8/18/2023    10:53 AM   Additional Questions   Roomed by Opal LEONG CMA     History of Present Illness       Reason for visit:  Follow up    She eats 0-1 servings of fruits and vegetables daily.She consumes 1 sweetened beverage(s) daily.She exercises with enough effort to increase her heart rate 9 or less minutes per day.  She exercises " "with enough effort to increase her heart rate 3 or less days per week.   She is taking medications regularly.       Chief Complaint   Patient presents with    Fatigue     Would like iron levels checked       Seen last week for evaluation of fatigue, worried about anemia.  She had a previous history of anemia with low ferritin due to combination of menstrual blood loss and lack of dietary iron  Since last year she has been taking molasses as a way to improve iron intake.    Denies any obvious blood loss such as bloody nose, excessive menses, surgeries, or excessive bruising.  Does not donate blood.    2.  Reports nonspecific fatigue of a generalized nature for the last few months.  Denies intestinal symptoms (i.e. No diarrhea, no constipation, no irregular BMs), Denies chest pain, shortness of breath, or dyspnea on exertion.  Denies fevers, unintended weight loss, unexplained abdominal pain, unexplained joint or muscle pain,  recent travels, or  history of autoimmune disease.   The patient does not feel that they are aware of any specific cause for this fatigue.      Recent labs in 2 weeks ago showed no obvious reasons for any specific fatigue    3.  Reports a history of a \"nervous bladder\".  She finds her self with urinary urgency.  She does state that she does not like to use pelvic restrooms and therefore will \"hold\" her urine until she can urinate in private.  She had a history of microscopic hematuria over the years has underwent evaluation at other clinics without a specific diagnosis or abnormal finding.  She was seen 2 weeks ago for concerns of a potential bladder infection.  Urinalysis did not show any evidence for infection, a very short course of oral antibiotics made no difference in her symptoms.      Review of Systems   Constitutional, HEENT, cardiovascular, pulmonary, gi and gu systems are negative, except as otherwise noted.      Objective    Pulse 78   Temp 98  F (36.7  C) (Oral)   Ht 1.676 m (5' " "6\")   Wt 46.7 kg (103 lb)   LMP 07/28/2023   SpO2 100%   Breastfeeding No   BMI 16.62 kg/m    Body mass index is 16.62 kg/m .  Physical Exam   GENERAL alert and no distress  EYES:  Normal sclera,conjunctiva, EOMI  HENT: facies symmetric  MS: extremities- no gross deformities of the visible extremities noted,   PSYCH: Alert and oriented times 3; speech- coherent  SKIN:  No obvious significant skin lesions on visible portions of face   NEURO:  Normal facial movements.  Appears to move normally                       "

## 2023-08-18 NOTE — PATIENT INSTRUCTIONS
" Tetanus vaccine given today, should get this every 10 years.     Covid vaccines are now recommended annually.  Get the most updated Covid vaccine when it becomes available, consider getting this at the same time as the annual influenza vaccine.      Referral to Uro-Gynecology clinic to evaluate your \"nervous bladder\"     For optimal bone health in the future:     --Calcium (at least 1200 mg per day)  --Vitamin D3 (at least 2000 units per day) supplements either separately or together in the form of Caltrate (or similar product).   --Regular exercise to keep muscles strong and weight stable  --No smoking         --Good Grains:  Oats, brown rice, Quinoa (these do not raise the blood sugar as much)     --Bad grains:  Anything made from wheat or white rice     (because these raise the blood sugars significantly, and the possible gluten issue from wheat for some people).      --Proteins:  Aim for \"lean proteins\" including chicken, fish, seafood, pork, turkey, and eggs (in moderation); Eat red meat only occasionally    "

## 2024-01-19 ENCOUNTER — OFFICE VISIT (OUTPATIENT)
Dept: FAMILY MEDICINE | Facility: CLINIC | Age: 24
End: 2024-01-19
Payer: COMMERCIAL

## 2024-01-19 VITALS
SYSTOLIC BLOOD PRESSURE: 106 MMHG | DIASTOLIC BLOOD PRESSURE: 73 MMHG | TEMPERATURE: 98 F | HEART RATE: 69 BPM | OXYGEN SATURATION: 100 %

## 2024-01-19 DIAGNOSIS — B07.8 COMMON WART: ICD-10-CM

## 2024-01-19 DIAGNOSIS — M25.441 FINGER JOINT SWELLING, RIGHT: Primary | ICD-10-CM

## 2024-01-19 LAB — WBC # BLD AUTO: 4.5 10E3/UL (ref 4–11)

## 2024-01-19 PROCEDURE — 36415 COLL VENOUS BLD VENIPUNCTURE: CPT

## 2024-01-19 PROCEDURE — 99213 OFFICE O/P EST LOW 20 MIN: CPT | Mod: 25

## 2024-01-19 PROCEDURE — 17111 DESTRUCTION B9 LESIONS 15/>: CPT

## 2024-01-19 PROCEDURE — 85048 AUTOMATED LEUKOCYTE COUNT: CPT

## 2024-01-19 NOTE — PROGRESS NOTES
ICD-10-CM    1. Finger joint swelling, right  M25.441 WBC count     WBC count      2. Common wart  B07.8         R second finger DIP and 4th finger DIP.  Minimal swelling but some tenderness along the joint line and more significant redness without any fluctuance.  Low suspicion for joint space infection or cellulitis, but checking WBC and will follow up with patient if that suggests a need for antibiotics.  Suspect that redness could be related to new onset warts and the body's inflammatory response to these.    No systemic symptoms or any other joint pain/swelling/redness, so lower suspicion for RA or other rheum issues at this time.  However, if symptoms persist, additional testing would be indicated.    PLAN:  Patient Instructions   Ibuprofen 600 mg three times daily or naproxen 2 tablets twice daily.  Choose one of these options and do this for 3 days.    Today we froze 4 warts on your right hand.  Return in 3 weeks if another freezing treatment is needed.  Can also use Compound W over the counter.    LN2 x 3 freeze-thaw cycles to each of four common warts on the R hand.  Pt tolerated procedure well.  Reviewed typical progression following wart freezing.    SUBJECTIVE:  Jack Nunes is a 23 year old female who presents to  today with some swelling and redness around the 2nd and 4th DIP joints on her right hand.  No known injury.  Does have some warts in this area on the 2nd finger but not on the 4th.  No fevers or chills.  Feeling generally well overall.    OBJECTIVE:  /73   Pulse 69   Temp 98  F (36.7  C) (Tympanic)   SpO2 100%   GEN: well-appearing, in NAD  EXT:  relatively flat common warts on R 2nd finger x 3, on dorsal PIP joint and two around the DIP joint dorsum/ulnar side.  R 3rd finger PIP joint dorsal aspect.  Mild tenderness at 2nd and 4th DIP joints and mild erythema.  No open sores noted.  No fluctuance.  Full AROM.

## 2024-01-19 NOTE — PATIENT INSTRUCTIONS
Ibuprofen 600 mg three times daily or naproxen 2 tablets twice daily.  Choose one of these options and do this for 3 days.    Today we froze 4 warts on your right hand.  Return in 3 weeks if another freezing treatment is needed.  Can also use Compound W over the counter.

## 2024-03-10 ENCOUNTER — HEALTH MAINTENANCE LETTER (OUTPATIENT)
Age: 24
End: 2024-03-10

## 2024-05-24 ENCOUNTER — APPOINTMENT (OUTPATIENT)
Dept: URBAN - METROPOLITAN AREA CLINIC 255 | Age: 24
Setting detail: DERMATOLOGY
End: 2024-05-24

## 2024-05-24 VITALS — WEIGHT: 110 LBS | HEIGHT: 55 IN

## 2024-05-24 DIAGNOSIS — L20.89 OTHER ATOPIC DERMATITIS: ICD-10-CM

## 2024-05-24 PROCEDURE — OTHER PATIENT SPECIFIC COUNSELING: OTHER

## 2024-05-24 PROCEDURE — 99203 OFFICE O/P NEW LOW 30 MIN: CPT

## 2024-05-24 PROCEDURE — OTHER COUNSELING: OTHER

## 2024-05-24 PROCEDURE — OTHER PHOTO-DOCUMENTATION: OTHER

## 2024-05-24 PROCEDURE — OTHER PRESCRIPTION: OTHER

## 2024-05-24 PROCEDURE — OTHER PRESCRIPTION MEDICATION MANAGEMENT: OTHER

## 2024-05-24 PROCEDURE — OTHER MIPS QUALITY: OTHER

## 2024-05-24 RX ORDER — TACROLIMUS 1 MG/G
OINTMENT TOPICAL BID
Qty: 60 | Refills: 0 | Status: ERX | COMMUNITY
Start: 2024-05-24

## 2024-05-24 ASSESSMENT — LOCATION DETAILED DESCRIPTION DERM
LOCATION DETAILED: RIGHT RADIAL DORSAL HAND
LOCATION DETAILED: LEFT ULNAR DORSAL HAND

## 2024-05-24 ASSESSMENT — LOCATION SIMPLE DESCRIPTION DERM
LOCATION SIMPLE: LEFT HAND
LOCATION SIMPLE: RIGHT HAND

## 2024-05-24 ASSESSMENT — LOCATION ZONE DERM: LOCATION ZONE: HAND

## 2024-05-24 NOTE — PROCEDURE: PRESCRIPTION MEDICATION MANAGEMENT
Detail Level: Zone
Render In Strict Bullet Format?: No
Initiate Treatment: tacrolimus ointment BID for 1 month

## 2024-05-24 NOTE — PROCEDURE: PATIENT SPECIFIC COUNSELING
Detail Level: Zone
-Discussed that it is not good to use steroids consistently for a long period of time and it would be best if the patient stop using her previously scribed steroid for the next month. \\n-Patient should be applying a heavy moisturizer after she washes her hands (recommended Vanicream  cream moisturizer), even at work. She should avoid harsh soaps (consider Vanicream soap).\\n-Recommend initiation of tacrolimus 0.1% ointment BID.\\n-RTC in 2 weeks if the eczema has not improved with RX & suggestions given. \\n-If the patient notices improvement RTC in 1 month to be reevaluated.

## 2024-08-23 DIAGNOSIS — Z91.010 ALLERGY TO PEANUTS: ICD-10-CM

## 2024-08-23 RX ORDER — EPINEPHRINE 0.3 MG/.3ML
0.3 INJECTION SUBCUTANEOUS PRN
Qty: 2 EACH | Refills: 3 | Status: SHIPPED | OUTPATIENT
Start: 2024-08-23

## 2024-10-17 DIAGNOSIS — J31.0 CHRONIC RHINITIS: ICD-10-CM

## 2024-10-17 RX ORDER — LORATADINE 10 MG/1
TABLET ORAL
Qty: 90 TABLET | Refills: 0 | Status: SHIPPED | OUTPATIENT
Start: 2024-10-17

## 2024-10-17 RX ORDER — FLUTICASONE PROPIONATE 50 MCG
SPRAY, SUSPENSION (ML) NASAL
Qty: 48 ML | Refills: 0 | Status: SHIPPED | OUTPATIENT
Start: 2024-10-17

## 2024-12-28 ENCOUNTER — OFFICE VISIT (OUTPATIENT)
Dept: FAMILY MEDICINE | Facility: CLINIC | Age: 24
End: 2024-12-28
Payer: COMMERCIAL

## 2024-12-28 VITALS
SYSTOLIC BLOOD PRESSURE: 111 MMHG | DIASTOLIC BLOOD PRESSURE: 76 MMHG | RESPIRATION RATE: 21 BRPM | HEART RATE: 62 BPM | TEMPERATURE: 98.7 F | OXYGEN SATURATION: 100 %

## 2024-12-28 DIAGNOSIS — R30.0 DYSURIA: Primary | ICD-10-CM

## 2024-12-28 DIAGNOSIS — B96.89 BV (BACTERIAL VAGINOSIS): ICD-10-CM

## 2024-12-28 DIAGNOSIS — N76.0 BV (BACTERIAL VAGINOSIS): ICD-10-CM

## 2024-12-28 LAB
ALBUMIN UR-MCNC: NEGATIVE MG/DL
APPEARANCE UR: CLEAR
BILIRUB UR QL STRIP: NEGATIVE
COLOR UR AUTO: YELLOW
GLUCOSE UR STRIP-MCNC: NEGATIVE MG/DL
HGB UR QL STRIP: ABNORMAL
KETONES UR STRIP-MCNC: NEGATIVE MG/DL
LEUKOCYTE ESTERASE UR QL STRIP: NEGATIVE
NITRATE UR QL: NEGATIVE
PH UR STRIP: 7.5 [PH] (ref 5–7)
SP GR UR STRIP: 1.02 (ref 1–1.03)
UROBILINOGEN UR STRIP-ACNC: 0.2 E.U./DL

## 2024-12-28 PROCEDURE — 99213 OFFICE O/P EST LOW 20 MIN: CPT

## 2024-12-28 PROCEDURE — 81001 URINALYSIS AUTO W/SCOPE: CPT

## 2024-12-28 RX ORDER — METRONIDAZOLE 500 MG/1
500 TABLET ORAL 2 TIMES DAILY
Qty: 14 TABLET | Refills: 0 | Status: SHIPPED | OUTPATIENT
Start: 2024-12-28 | End: 2025-01-04

## 2024-12-28 NOTE — PROGRESS NOTES
Assessment & Plan     Dysuria  UA negative for UTI.  - UA Macroscopic with reflex to Microscopic and Culture  - UA Microscopic with Reflex to Culture    BV (bacterial vaginosis)  Will treat for BV due to symptom presentation.  Has been using a heavily scented body wash she feels is causing her issues.  Wet prep test not available at this clinic.  - metroNIDAZOLE (FLAGYL) 500 MG tablet; Take 1 tablet (500 mg) by mouth 2 times daily for 7 days.            Return in about 1 week (around 1/4/2025), or if symptoms worsen or fail to improve.    Subjective   Jack is a 24 year old, presenting for the following health issues:  Urgent Care (Pt states she has had cloudy urine and slight odor for the past week.)    HPI       Genitourinary - Female  Onset/Duration: 1 week  Description:   Painful urination (Dysuria): No           Frequency: No  Blood in urine (Hematuria): No  Delay in urine (Hesitency): No  Intensity: none  Progression of Symptoms:  same  Accompanying Signs & Symptoms:  Fever/chills: No  Flank pain: No  Nausea and vomiting: No  Vaginal symptoms: odor  Abdominal/Pelvic Pain: No  History:   History of frequent UTI s: No  History of kidney stones: No  Sexually Active: No  Possibility of pregnancy: No  Precipitating or alleviating factors: Used a new body wash that was heavily scented  Therapies tried and outcome:  none         Review of Systems  Constitutional, HEENT, cardiovascular, pulmonary, gi and gu systems are negative, except as otherwise noted.      Objective    /76   Pulse 62   Temp 98.7  F (37.1  C) (Tympanic)   Resp 21   SpO2 100%   There is no height or weight on file to calculate BMI.  Physical Exam   GENERAL: alert and no distress  MS: no gross musculoskeletal defects noted, no edema    Results for orders placed or performed in visit on 12/28/24   UA Macroscopic with reflex to Microscopic and Culture     Status: Abnormal    Specimen: Urine, Midstream   Result Value Ref Range    Color Urine  Yellow Colorless, Straw, Light Yellow, Yellow    Appearance Urine Clear Clear    Glucose Urine Negative Negative mg/dL    Bilirubin Urine Negative Negative    Ketones Urine Negative Negative mg/dL    Specific Gravity Urine 1.020 1.003 - 1.035    Blood Urine Trace (A) Negative    pH Urine 7.5 (H) 5.0 - 7.0    Protein Albumin Urine Negative Negative mg/dL    Urobilinogen Urine 0.2 0.2, 1.0 E.U./dL    Nitrite Urine Negative Negative    Leukocyte Esterase Urine Negative Negative             Signed Electronically by:  [employer] [facility]

## 2024-12-29 LAB
BACTERIA #/AREA URNS HPF: ABNORMAL /HPF
MUCOUS THREADS #/AREA URNS LPF: PRESENT /LPF
RBC #/AREA URNS AUTO: ABNORMAL /HPF
SQUAMOUS #/AREA URNS AUTO: ABNORMAL /LPF
WBC #/AREA URNS AUTO: ABNORMAL /HPF

## 2025-01-22 ENCOUNTER — OFFICE VISIT (OUTPATIENT)
Dept: FAMILY MEDICINE | Facility: CLINIC | Age: 25
End: 2025-01-22
Payer: COMMERCIAL

## 2025-01-22 VITALS
HEART RATE: 103 BPM | OXYGEN SATURATION: 98 % | TEMPERATURE: 98.6 F | DIASTOLIC BLOOD PRESSURE: 60 MMHG | RESPIRATION RATE: 18 BRPM | SYSTOLIC BLOOD PRESSURE: 100 MMHG

## 2025-01-22 DIAGNOSIS — K08.89 PAIN, DENTAL: Primary | ICD-10-CM

## 2025-01-22 PROCEDURE — 99213 OFFICE O/P EST LOW 20 MIN: CPT

## 2025-01-22 ASSESSMENT — ENCOUNTER SYMPTOMS
SORE THROAT: 0
COUGH: 0
CHILLS: 0
FATIGUE: 0

## 2025-01-23 NOTE — PROGRESS NOTES
Assessment & Plan       ICD-10-CM    1. Pain, dental  K08.89            Patient instructions:  Try ibuprofen for pain, reach out to your dentist for follow up.     Medical decision making:  Pt presents with pain to gums that started today. On exam there appears to be some mild swelling posterior to the molars which could indicate wisdom teeth erupting. No signs of infection noted. Discussed using ibuprofen for pain relief and following up with dentist for further evaluation if pain persists.     No follow-ups on file.    At the end of the encounter, I discussed results, diagnosis, medications. Discussed red flags for immediate return to clinic/ER, as well as indications for follow up if no improvement. Patient understood and agreed to plan. Patient was stable for discharge.    Rodney Santiago is a 24 year old female who presents to clinic today the following health issues:  Chief Complaint   Patient presents with    Urgent Care     Pt states she has had swollen gland next to molars for the past day.      Pt reports that she feels like there is an infection on the back of her molars. Reports she thinks that her roommate used her toothbrush which is causing the infection. Denies any fever, sore throat or any other complaints. Does report she still has her wisdom teeth. Last visit to the dentist was about 6 months ago.             Review of Systems   Constitutional:  Negative for chills and fatigue.   HENT:  Positive for dental problem. Negative for congestion, mouth sores, postnasal drip and sore throat.    Respiratory:  Negative for cough.        Problem List:  2019-01: Major depressive disorder, single episode, unspecified  2015-04: Exercise-induced asthma  2012-08: Chronic rhinitis  2010-07: Allergy to peanuts  2010-07: Seasonal allergies      No past medical history on file.    Social History     Tobacco Use    Smoking status: Never     Passive exposure: Never    Smokeless tobacco: Never   Substance Use  Topics    Alcohol use: Never           Objective    /60   Pulse 103   Temp 98.6  F (37  C) (Tympanic)   Resp 18   SpO2 98%   Physical Exam  Vitals reviewed.   Constitutional:       General: She is not in acute distress.     Appearance: Normal appearance. She is not ill-appearing, toxic-appearing or diaphoretic.   HENT:      Head: Normocephalic and atraumatic.      Mouth/Throat:      Lips: Pink.      Mouth: Mucous membranes are moist. No oral lesions.      Dentition: Gingival swelling (small amount of swelling postrior to molars. No signs of infection) present. No dental caries or gum lesions.   Cardiovascular:      Rate and Rhythm: Normal rate.   Pulmonary:      Effort: Pulmonary effort is normal.   Neurological:      Mental Status: She is alert.              SHELIA HERNANDEZ CNP

## 2025-03-16 ENCOUNTER — HEALTH MAINTENANCE LETTER (OUTPATIENT)
Age: 25
End: 2025-03-16